# Patient Record
Sex: FEMALE | Race: BLACK OR AFRICAN AMERICAN
[De-identification: names, ages, dates, MRNs, and addresses within clinical notes are randomized per-mention and may not be internally consistent; named-entity substitution may affect disease eponyms.]

---

## 2018-10-24 ENCOUNTER — HOSPITAL ENCOUNTER (EMERGENCY)
Dept: HOSPITAL 72 - EMR | Age: 58
LOS: 1 days | Discharge: HOME | End: 2018-10-25
Payer: COMMERCIAL

## 2018-10-24 VITALS — HEIGHT: 64 IN | WEIGHT: 198 LBS | BODY MASS INDEX: 33.8 KG/M2

## 2018-10-24 VITALS — DIASTOLIC BLOOD PRESSURE: 61 MMHG | SYSTOLIC BLOOD PRESSURE: 140 MMHG

## 2018-10-24 DIAGNOSIS — M54.6: Primary | ICD-10-CM

## 2018-10-24 DIAGNOSIS — Z90.710: ICD-10-CM

## 2018-10-24 DIAGNOSIS — E78.00: ICD-10-CM

## 2018-10-24 LAB
APTT PPP: (no result) S
GLUCOSE UR STRIP-MCNC: NEGATIVE MG/DL
KETONES UR QL STRIP: NEGATIVE
LEUKOCYTE ESTERASE UR QL STRIP: (no result)
NITRITE UR QL STRIP: NEGATIVE
PH UR STRIP: 6 [PH] (ref 4.5–8)
PROT UR QL STRIP: NEGATIVE
SP GR UR STRIP: 1.02 (ref 1–1.03)
UROBILINOGEN UR-MCNC: NORMAL MG/DL (ref 0–1)

## 2018-10-24 PROCEDURE — 86140 C-REACTIVE PROTEIN: CPT

## 2018-10-24 PROCEDURE — 85379 FIBRIN DEGRADATION QUANT: CPT

## 2018-10-24 PROCEDURE — 96374 THER/PROPH/DIAG INJ IV PUSH: CPT

## 2018-10-24 PROCEDURE — 85025 COMPLETE CBC W/AUTO DIFF WBC: CPT

## 2018-10-24 PROCEDURE — 36415 COLL VENOUS BLD VENIPUNCTURE: CPT

## 2018-10-24 PROCEDURE — 71275 CT ANGIOGRAPHY CHEST: CPT

## 2018-10-24 PROCEDURE — 85651 RBC SED RATE NONAUTOMATED: CPT

## 2018-10-24 PROCEDURE — 93005 ELECTROCARDIOGRAM TRACING: CPT

## 2018-10-24 PROCEDURE — 81003 URINALYSIS AUTO W/O SCOPE: CPT

## 2018-10-24 PROCEDURE — 84484 ASSAY OF TROPONIN QUANT: CPT

## 2018-10-24 PROCEDURE — 80053 COMPREHEN METABOLIC PANEL: CPT

## 2018-10-24 PROCEDURE — 84443 ASSAY THYROID STIM HORMONE: CPT

## 2018-10-24 PROCEDURE — 99284 EMERGENCY DEPT VISIT MOD MDM: CPT

## 2018-10-24 PROCEDURE — 96375 TX/PRO/DX INJ NEW DRUG ADDON: CPT

## 2018-10-25 VITALS — SYSTOLIC BLOOD PRESSURE: 114 MMHG | DIASTOLIC BLOOD PRESSURE: 59 MMHG

## 2018-10-25 VITALS — DIASTOLIC BLOOD PRESSURE: 62 MMHG | SYSTOLIC BLOOD PRESSURE: 122 MMHG

## 2018-10-25 LAB
ADD MANUAL DIFF: NO
ALBUMIN SERPL-MCNC: 3.4 G/DL (ref 3.4–5)
ALBUMIN/GLOB SERPL: 0.7 {RATIO} (ref 1–2.7)
ALP SERPL-CCNC: 110 U/L (ref 46–116)
ALT SERPL-CCNC: 28 U/L (ref 12–78)
ANION GAP SERPL CALC-SCNC: 9 MMOL/L (ref 5–15)
APPEARANCE UR: CLEAR
AST SERPL-CCNC: 20 U/L (ref 15–37)
BASOPHILS NFR BLD AUTO: 1.7 % (ref 0–2)
BILIRUB SERPL-MCNC: 0.2 MG/DL (ref 0.2–1)
BUN SERPL-MCNC: 19 MG/DL (ref 7–18)
CALCIUM SERPL-MCNC: 9 MG/DL (ref 8.5–10.1)
CHLORIDE SERPL-SCNC: 104 MMOL/L (ref 98–107)
CO2 SERPL-SCNC: 27 MMOL/L (ref 21–32)
CREAT SERPL-MCNC: 0.7 MG/DL (ref 0.55–1.3)
EOSINOPHIL NFR BLD AUTO: 4.9 % (ref 0–3)
ERYTHROCYTE [DISTWIDTH] IN BLOOD BY AUTOMATED COUNT: 11.9 % (ref 11.6–14.8)
GLOBULIN SER-MCNC: 4.7 G/DL
HCT VFR BLD CALC: 35.9 % (ref 37–47)
HGB BLD-MCNC: 12.6 G/DL (ref 12–16)
LYMPHOCYTES NFR BLD AUTO: 47.6 % (ref 20–45)
MCV RBC AUTO: 86 FL (ref 80–99)
MONOCYTES NFR BLD AUTO: 5.9 % (ref 1–10)
NEUTROPHILS NFR BLD AUTO: 39.9 % (ref 45–75)
PLATELET # BLD: 223 K/UL (ref 150–450)
POTASSIUM SERPL-SCNC: 3.9 MMOL/L (ref 3.5–5.1)
RBC # BLD AUTO: 4.16 M/UL (ref 4.2–5.4)
SODIUM SERPL-SCNC: 140 MMOL/L (ref 136–145)
WBC # BLD AUTO: 5 K/UL (ref 4.8–10.8)

## 2018-10-25 NOTE — EMERGENCY ROOM REPORT
History of Present Illness


General


Chief Complaint:  Back Pain-No Injury


Source:  Patient





Present Illness


HPI


Patient is a 58-year-old female who presented after increased the left-sided 

back pain.  The patient reports having increased pain to the left shoulder and 

back this is worse with movement.  Patient prior history of thyroid disease and 

had been noted to have a multinodular goiter in the past.  The patient reports 

having previous biopsy but is not currently taking thyroid medications.  

Patient denies any fever.  She denies any recent trauma.


Allergies:  


Coded Allergies:  


     NO KNOWN ALLERGIES (Unverified  Allergy, Unknown, 1/12/16)





Patient History


Past Medical History:  see triage record


Last Menstrual Period:  n/a


Reviewed Nursing Documentation:  PMH: Agreed; PSxH: Agreed





Nursing Documentation-PMH


Past Medical History:  No Stated History


Hx Cardiac Problems:  Yes - High cholesterol, hysterectomy





Review of Systems


All Other Systems:  negative except mentioned in HPI





Physical Exam





Vital Signs








  Date Time  Temp Pulse Resp B/P (MAP) Pulse Ox O2 Delivery O2 Flow Rate FiO2


 


10/24/18 22:35 98.1 78 16 164/95 99 Room Air  








Sp02 EP Interpretation:  reviewed, normal


General Appearance:  normal inspection, well appearing, no apparent distress, 

alert, GCS 15, non-toxic


Head:  atraumatic


ENT:  normal ENT inspection, hearing grossly normal, normal voice


Neck:  normal inspection, full range of motion, supple, no bony tend


Respiratory:  normal inspection, lungs clear, normal breath sounds, no 

respiratory distress, no retraction, no wheezing


Cardiovascular #1:  regular rate, rhythm, no edema


Gastrointestinal:  normal inspection, normal bowel sounds, non tender, soft, no 

guarding, no hernia


Genitourinary:  no CVA tenderness


Musculoskeletal:  normal inspection, back normal, normal range of motion, 

decreased range of motion, other - tenderness to left shoulder


Neurologic:  normal inspection, alert, oriented x3, responsive, CNs III-XII nml 

as tested, motor strength/tone normal, speech normal


Psychiatric:  normal inspection, judgement/insight normal, mood/affect normal


Skin:  normal inspection, normal color, no rash





Medical Decision Making


Diagnostic Impression:  


 Primary Impression:  


 Thoracic spine pain


ER Course


Patient presented for back pain.  Differential diagnosis included but was not 

limited to pulmonary embolism, herniated disc, cauda equina syndrome, abdominal 

aortic aneurysm, perforated ulcer, spinal epidural abscess, spinal stenosis, 

lumbar fracture, metastatic lesion, pyelonephritis.  Because of complexity of 

patient's case laboratory testing and imaging studies were ordered.


Laboratory studies were unremarkable apart from d-dimer which was elevated.  CT 

the chest read by radiology showed no evidence of central pulmonary embolism, 

there is noted to be a T3 lesion.  The patient is advised to follow up with  

primary care doctor in 1-2 days for recheck.  Patient is advised to return if 

any worsening condition or if any changes in status that are concerning.





This report is dictated with Dragon transcription software which may 

occasionally lead to discrepancies related to use of this software.





Labs








Test


  10/24/18


23:29 10/24/18


23:55


 


Urine Color Pale yellow  


 


Urine Appearance Clear  


 


Urine pH 6 (4.5-8.0)  


 


Urine Specific Gravity


  1.020


(1.005-1.035) 


 


 


Urine Protein


  Negative


(NEGATIVE) 


 


 


Urine Glucose (UA)


  Negative


(NEGATIVE) 


 


 


Urine Ketones


  Negative


(NEGATIVE) 


 


 


Urine Blood 3+ (NEGATIVE)  


 


Urine Nitrite


  Negative


(NEGATIVE) 


 


 


Urine Bilirubin


  Negative


(NEGATIVE) 


 


 


Urine Urobilinogen


  Normal MG/DL


(0.0-1.0) 


 


 


Urine Leukocyte Esterase 1+ (NEGATIVE)  


 


Urine RBC


  2-4 /HPF (0 -


2) 


 


 


Urine WBC


  0-2 /HPF (0 -


2) 


 


 


Urine Squamous Epithelial


Cells Few /LPF


(NONE/OCC) 


 


 


Urine Bacteria


  None /HPF


(NONE) 


 


 


White Blood Count


  


  5.0 K/UL


(4.8-10.8)


 


Red Blood Count


  


  4.16 M/UL


(4.20-5.40)


 


Hemoglobin


  


  12.6 G/DL


(12.0-16.0)


 


Hematocrit


  


  35.9 %


(37.0-47.0)


 


Mean Corpuscular Volume  86 FL (80-99) 


 


Mean Corpuscular Hemoglobin


  


  30.2 PG


(27.0-31.0)


 


Mean Corpuscular Hemoglobin


Concent 


  35.0 G/DL


(32.0-36.0)


 


Red Cell Distribution Width


  


  11.9 %


(11.6-14.8)


 


Platelet Count


  


  223 K/UL


(150-450)


 


Mean Platelet Volume


  


  7.9 FL


(6.5-10.1)


 


Neutrophils (%) (Auto)


  


  39.9 %


(45.0-75.0)


 


Lymphocytes (%) (Auto)


  


  47.6 %


(20.0-45.0)


 


Monocytes (%) (Auto)


  


  5.9 %


(1.0-10.0)


 


Eosinophils (%) (Auto)


  


  4.9 %


(0.0-3.0)


 


Basophils (%) (Auto)


  


  1.7 %


(0.0-2.0)


 


Erythrocyte Sedimentation Rate


  


  50 MM/HR


(0-30)


 


D-Dimer


  


  0.65 mg/L FEU


(0.00-0.49)


 


Sodium Level


  


  140 MMOL/L


(136-145)


 


Potassium Level


  


  3.9 MMOL/L


(3.5-5.1)


 


Chloride Level


  


  104 MMOL/L


()


 


Carbon Dioxide Level


  


  27 MMOL/L


(21-32)


 


Anion Gap


  


  9 mmol/L


(5-15)


 


Blood Urea Nitrogen


  


  19 mg/dL


(7-18)


 


Creatinine


  


  0.7 MG/DL


(0.55-1.30)


 


Estimat Glomerular Filtration


Rate 


  > 60 mL/min


(>60)


 


Glucose Level


  


  106 MG/DL


()


 


Calcium Level


  


  9.0 MG/DL


(8.5-10.1)


 


Total Bilirubin


  


  0.2 MG/DL


(0.2-1.0)


 


Aspartate Amino Transf


(AST/SGOT) 


  20 U/L (15-37) 


 


 


Alanine Aminotransferase


(ALT/SGPT) 


  28 U/L (12-78) 


 


 


Alkaline Phosphatase


  


  110 U/L


()


 


Troponin I


  


  0.000 ng/mL


(0.000-0.056)


 


C-Reactive Protein,


Quantitative 


  < 0.4 mg/dL


(0.00-0.90)


 


Total Protein


  


  8.1 G/DL


(6.4-8.2)


 


Albumin


  


  3.4 G/DL


(3.4-5.0)


 


Globulin  4.7 g/dL 


 


Albumin/Globulin Ratio  0.7 (1.0-2.7) 


 


Thyroid Stimulating Hormone


(TSH) 


  0.930 uiU/mL


(0.358-3.740)








EKG Diagnostic Results


Rate:  normal - 73


Rhythm:  NSR


ST Segments:  no acute changes





Last Vital Signs








  Date Time  Temp Pulse Resp B/P (MAP) Pulse Ox O2 Delivery O2 Flow Rate FiO2


 


10/25/18 01:02 98.1 74 13 114/59 99 Room Air  








Status:  improved


Disposition:  HOME, SELF-CARE


Condition:  Stable


Scripts


Ibuprofen* (MOTRIN*) 600 Mg Tablet


600 MG ORAL Q8H PRN for For Pain, #30 TAB 0 Refills


   Prov: Javi Walker MD         10/25/18 


Tramadol Hcl* (ULTRAM*) 50 Mg Tablet


50 MG ORAL Q6H PRN for For Pain, #10 TAB 0 Refills


   Prov: Javi Walker MD         10/25/18


Referrals:  


NON PHYSICIAN (PCP)











Javi Walker MD Oct 25, 2018 02:34

## 2018-10-25 NOTE — DIAGNOSTIC IMAGING REPORT
Indication: Chest pain

 

Technique: Continuous helical transaxial imaging of the chest was obtained from the

thoracic inlet to the upper abdomen during rapid intravenous contrast administration.

Arterial phase of enhancement obtained. Coronal 2-D reformats were also obtained and

maximum intensity projection images in multiple planes. Study obtained in a Siemens

sensation 64 slice CT. Automatic Exposure Control was utilized.

 

 

Total Dose length Product (DLP):  1555.52 mGycm

 

CT Dose Index Volume (CTDIvol):   42.33 mGy

 

Comparison: None

 

Findings: The pulmonary artery is well opacified and shows no filling defects. There

is no adenopathy, pleural or pericardial effusions are identified. There is no aortic

dissection or aneurysm identified within the chest. The lungs are clear. There is a

multinodular thyroid gland with areas of hypoattenuation suggestive of nodules. The

gland is enlarged. Visualized part of the upper abdomen is unremarkable. Tiny

sclerotic focus noted in the T3 vertebra probably a bone island. This is nonspecific.

 

Impression:

 

Negative CTA of the chest

 

Multinodular goiter

 

The CT scanner at [facility] is accredited by the [employer] College of

Radiology and the scans are performed using dose optimization techniques as

appropriate to a performed exam including Automatic Exposure control.

## 2018-10-26 NOTE — CARDIOLOGY REPORT
--------------- APPROVED REPORT --------------





EKG Measurement

Heart [id number]

ME [id number]

[id number]

[id number]

[id number]





Normal sinus rhythm

Cannot rule out Anterior infarct, age undetermined

Abnormal ECG